# Patient Record
(demographics unavailable — no encounter records)

---

## 2025-03-31 NOTE — PLAN
[FreeTextEntry1] : 28 year old female presents for routine gyn exam  BSE taught Breast and pelvic exam performed Pap conducted STI screen ordered  HSV- rx sent for Valtrex  Depression/Anxiety offered MH services referrals f/u prn   h/o ruptured hemorrhagic cyst- offered sonogram- pt declined  Contraception- declines at this time will use condoms  RTO in 1 year or PRN

## 2025-03-31 NOTE — HISTORY OF PRESENT ILLNESS
[FreeTextEntry1] :     2025. STEVE BRINK 28 year old female  LMP presents for annual visit.  She feels well and offers no complaints. She has monthly menses, not too heavy or painful. She denies intermenstrual bleeding, abn discharge or vaginitis sxs. No urinary complaints. She has normal BM, no bloody stool. She denies abdominal or pelvic pain.  Denies changes in medical status, medications, serious illness, hospitalizations, and surgeries.  with a new partner and requesting valtrex for suppression for partner protection  PHQ9 score 23- reports shes "starting to feel better" previously had a therapist- interested in referrals at this time  OBHx:    medical   GynHx: +h/o genital HSV- no outbreaks in over a year +h/o ruptured hemorrhagic cyst no h/o  fibroids,  abnl paps, or pelvic infections.  PMH: depression/anxiety atrial fibrillation  SHx: none  Meds: valtrex  All: NKDA   Soc: +ETOH, marijuana and alcohol 3-6x/week  FHx: Denies FHx of breast, ovarian, uterine or colon cancer.